# Patient Record
Sex: MALE | Race: WHITE | NOT HISPANIC OR LATINO | URBAN - NONMETROPOLITAN AREA
[De-identification: names, ages, dates, MRNs, and addresses within clinical notes are randomized per-mention and may not be internally consistent; named-entity substitution may affect disease eponyms.]

---

## 2024-01-23 ENCOUNTER — LAB OUTSIDE AN ENCOUNTER (OUTPATIENT)
Dept: URBAN - NONMETROPOLITAN AREA CLINIC 2 | Facility: CLINIC | Age: 54
End: 2024-01-23

## 2024-01-23 ENCOUNTER — OFFICE VISIT (OUTPATIENT)
Dept: URBAN - NONMETROPOLITAN AREA CLINIC 2 | Facility: CLINIC | Age: 54
End: 2024-01-23
Payer: MEDICARE

## 2024-01-23 ENCOUNTER — DASHBOARD ENCOUNTERS (OUTPATIENT)
Age: 54
End: 2024-01-23

## 2024-01-23 VITALS
SYSTOLIC BLOOD PRESSURE: 148 MMHG | DIASTOLIC BLOOD PRESSURE: 95 MMHG | HEART RATE: 73 BPM | HEIGHT: 69 IN | WEIGHT: 187.8 LBS | BODY MASS INDEX: 27.81 KG/M2

## 2024-01-23 DIAGNOSIS — B18.2 CHRONIC HEPATITIS C: ICD-10-CM

## 2024-01-23 DIAGNOSIS — K59.09 OTHER CONSTIPATION: ICD-10-CM

## 2024-01-23 DIAGNOSIS — R11.0 NAUSEA: ICD-10-CM

## 2024-01-23 DIAGNOSIS — R10.84 ABDOMINAL PAIN, GENERALIZED: ICD-10-CM

## 2024-01-23 PROCEDURE — 99204 OFFICE O/P NEW MOD 45 MIN: CPT

## 2024-01-23 RX ORDER — FAMOTIDINE 40 MG/1
1 TABLET AT BEDTIME TABLET, FILM COATED ORAL ONCE A DAY
Qty: 90 TABLET | Refills: 3 | OUTPATIENT
Start: 2024-01-24

## 2024-01-23 RX ORDER — DICYCLOMINE HYDROCHLORIDE 20 MG/1
TAKE 1 TABLET (20 MG) BY ORAL ROUTE 4 TIMES PER DAY TABLET ORAL
Qty: 120 | Refills: 5 | Status: ACTIVE | COMMUNITY
Start: 2018-08-10 | End: 1900-01-01

## 2024-01-23 RX ORDER — PANTOPRAZOLE SODIUM 20 MG/1
1 TABLET TABLET, DELAYED RELEASE ORAL ONCE A DAY
Qty: 90 TABLET | Refills: 3 | OUTPATIENT
Start: 2024-01-24

## 2024-01-23 NOTE — HPI-TODAY'S VISIT:
1/23/2024 iBll presents to clinic to reestablish care with a history of hepatitis C with successful treatment with interferon.  Today his main complaint is upper abdominal pain with increased borborygmi.  He denies any change in his bowel habits.  When he eats he says it "sounds like gravel in his stomach."  He has a tender area above his umbilicus that is worse with lifting and movement.  He does do a lot of heavy lifting for work as a . He does not drink alcohol or use drugs and only smokes cigarettes.  His last use of alcohol was 5 years ago. He does endorse a sense of dysphagia where solids get stuck when he is swallowing and takes some time for it eventually to go down.  He questions the need for esophageal dilation.  States he had this scheduled with Dr. Evans in the past and it was canceled. Previous notes from Dr. Evans indicate he presumed cirrhosis, documented history of HEENT treated with lactulose and Xifaxan, last imaging of the liver was 2016 consistent with cirrhosis but no mass. Patient has no symptoms of hepatic cephalopathy.  He has successfully regained his weight since we last saw him.  He has a good appetite.  He denies any observance of blood in his stool or dark tarry stool.  He avoids NSAIDs. He denies odynophagia or weight loss 2/2016 lap cholecystectomy with Dr. Dodd for cholelithiasis EGD with Dr. Evans 12/2015 for abdominal pain and weight loss with normal findings pathology benign Past colonoscopy 2014 negative other than a polyp completed with Dr. Wilman Unger. Patient states he has not followed up with gastroenterology since he last saw Dr. Evans. He has stopped working secondary to his abdominal pain and is eager to get this evaluated. He is not taking any acid suppressant medications but has used some of his sisters Carafat with minimal improvement. SP

## 2024-01-24 ENCOUNTER — TELEPHONE ENCOUNTER (OUTPATIENT)
Dept: URBAN - NONMETROPOLITAN AREA CLINIC 2 | Facility: CLINIC | Age: 54
End: 2024-01-24

## 2024-01-24 LAB
(TTG) AB, IGA: <1
(TTG) AB, IGG: <1
A/G RATIO: 2.1
ABSOLUTE BASOPHILS: 50
ABSOLUTE EOSINOPHILS: 114
ABSOLUTE LYMPHOCYTES: 2016
ABSOLUTE MONOCYTES: 618
ABSOLUTE NEUTROPHILS: 4303
ALBUMIN: 4.8
ALKALINE PHOSPHATASE: 52
ALT (SGPT): 14
ANTIGLIADIN ABS, IGA: 15.6
AST (SGOT): 18
BASOPHILS: 0.7
BILIRUBIN, TOTAL: 0.5
BUN/CREATININE RATIO: (no result)
BUN: 12
C-REACTIVE PROTEIN, QUANT: 4.2
CALCIUM: 9.7
CARBON DIOXIDE, TOTAL: 26
CHLORIDE: 103
CREATININE: 0.71
EGFR: 108
EOSINOPHILS: 1.6
GLIADIN (DEAMIDATED) AB (IGG): 1.5
GLOBULIN, TOTAL: 2.3
GLUCOSE: 99
HEMATOCRIT: 41.1
HEMOGLOBIN: 14.4
IMMUNOGLOBULIN A: 188
IMMUNOGLOBULIN A: 188
INR: 1
INTERPRETATION: (no result)
LYMPHOCYTES: 28.4
MCH: 32.4
MCHC: 35
MCV: 92.4
MONOCYTES: 8.7
MPV: 10.1
NEUTROPHILS: 60.6
PLATELET COUNT: 255
POTASSIUM: 4.2
PROTEIN, TOTAL: 7.1
PT: 11
RDW: 12.3
RED BLOOD CELL COUNT: 4.45
SED RATE BY MODIFIED: 6
SODIUM: 138
TISSUE TRANSGLUTAMINASE AB, IGA: <1
WHITE BLOOD CELL COUNT: 7.1

## 2024-01-25 ENCOUNTER — LAB OUTSIDE AN ENCOUNTER (OUTPATIENT)
Dept: URBAN - NONMETROPOLITAN AREA CLINIC 2 | Facility: CLINIC | Age: 54
End: 2024-01-25

## 2024-01-25 ENCOUNTER — OFFICE VISIT (OUTPATIENT)
Dept: URBAN - METROPOLITAN AREA MEDICAL CENTER 1 | Facility: MEDICAL CENTER | Age: 54
End: 2024-01-25
Payer: MEDICARE

## 2024-01-25 DIAGNOSIS — K22.2 ACQUIRED ESOPHAGEAL RING: ICD-10-CM

## 2024-01-25 DIAGNOSIS — K22.89 OTHER SPECIFIED DISEASE OF ESOPHAGUS: ICD-10-CM

## 2024-01-25 DIAGNOSIS — K29.60 ADENOPAPILLOMATOSIS GASTRICA: ICD-10-CM

## 2024-01-25 PROCEDURE — 43239 EGD BIOPSY SINGLE/MULTIPLE: CPT | Performed by: INTERNAL MEDICINE

## 2024-01-25 PROCEDURE — 43249 ESOPH EGD DILATION <30 MM: CPT | Performed by: INTERNAL MEDICINE

## 2024-01-25 RX ORDER — PANTOPRAZOLE SODIUM 20 MG/1
1 TABLET TABLET, DELAYED RELEASE ORAL ONCE A DAY
Qty: 90 TABLET | Refills: 3 | Status: ACTIVE | COMMUNITY
Start: 2024-01-24

## 2024-01-25 RX ORDER — DICYCLOMINE HYDROCHLORIDE 20 MG/1
TAKE 1 TABLET (20 MG) BY ORAL ROUTE 4 TIMES PER DAY TABLET ORAL
Qty: 120 | Refills: 5 | Status: ACTIVE | COMMUNITY
Start: 2018-08-10 | End: 1900-01-01

## 2024-01-25 RX ORDER — FAMOTIDINE 40 MG/1
1 TABLET AT BEDTIME TABLET, FILM COATED ORAL ONCE A DAY
Qty: 90 TABLET | Refills: 3 | Status: ACTIVE | COMMUNITY
Start: 2024-01-24

## 2024-01-29 LAB
AP CASE REPORT: (no result)
AP FINAL DIAGNOSIS: (no result)
AP GROSS DESCRIPTION: (no result)
AP MICROSCOPIC DESCRIPTION: (no result)

## 2024-05-07 ENCOUNTER — OFFICE VISIT (OUTPATIENT)
Dept: URBAN - NONMETROPOLITAN AREA CLINIC 2 | Facility: CLINIC | Age: 54
End: 2024-05-07

## 2024-05-07 RX ORDER — PANTOPRAZOLE SODIUM 20 MG/1
1 TABLET TABLET, DELAYED RELEASE ORAL ONCE A DAY
Qty: 90 TABLET | Refills: 3 | Status: ACTIVE | COMMUNITY
Start: 2024-01-24

## 2024-05-07 RX ORDER — FAMOTIDINE 40 MG/1
1 TABLET AT BEDTIME TABLET, FILM COATED ORAL ONCE A DAY
Qty: 90 TABLET | Refills: 3 | Status: ACTIVE | COMMUNITY
Start: 2024-01-24

## 2024-05-07 RX ORDER — DICYCLOMINE HYDROCHLORIDE 20 MG/1
TAKE 1 TABLET (20 MG) BY ORAL ROUTE 4 TIMES PER DAY TABLET ORAL
Qty: 120 | Refills: 5 | Status: ACTIVE | COMMUNITY
Start: 2018-08-10 | End: 1900-01-01

## 2024-09-04 ENCOUNTER — OFFICE VISIT (OUTPATIENT)
Dept: URBAN - NONMETROPOLITAN AREA CLINIC 2 | Facility: CLINIC | Age: 54
End: 2024-09-04